# Patient Record
Sex: MALE | Race: WHITE | Employment: FULL TIME | URBAN - METROPOLITAN AREA
[De-identification: names, ages, dates, MRNs, and addresses within clinical notes are randomized per-mention and may not be internally consistent; named-entity substitution may affect disease eponyms.]

---

## 2021-03-19 ENCOUNTER — THERAPY VISIT (OUTPATIENT)
Dept: PHYSICAL THERAPY | Facility: CLINIC | Age: 31
End: 2021-03-19
Payer: COMMERCIAL

## 2021-03-19 DIAGNOSIS — M25.312 INSTABILITY OF LEFT SHOULDER JOINT: ICD-10-CM

## 2021-03-19 PROCEDURE — 97161 PT EVAL LOW COMPLEX 20 MIN: CPT | Mod: GP | Performed by: PHYSICAL THERAPIST

## 2021-03-19 PROCEDURE — 97110 THERAPEUTIC EXERCISES: CPT | Mod: GP | Performed by: PHYSICAL THERAPIST

## 2021-03-19 PROCEDURE — 97112 NEUROMUSCULAR REEDUCATION: CPT | Mod: GP | Performed by: PHYSICAL THERAPIST

## 2021-03-19 NOTE — PROGRESS NOTES
Physical Therapy Initial Evaluation  Subjective:  HPI                    Objective:  System    Physical Exam    General     ROS     SHOULDER EVALUATION    Physical Therapy Initial Examination/Evaluation March 19, 2021   Dieudonne Valadez is a 30 year old male referred to physical therapy by Dr. Betancourt for treatment of L shoulder instability  with Precautions/Restrictions/MD instructions E&T    Therapist Assessment:   Clinical Impression: Pt presents to PT with primary complaint of L shoulder pain and instability.  Per clinical examination, pt with slight limitation in ROM and strength following dislocation.   Pt will benefit from skilled physical therapy to regain ROM and initiate strength and stabilization program.      Subjective: Pt reports that his L shoulder dislocated on 2/27/2021 when the neighbors dog got into his yard and attacked his dog. He was able to put his shoulder back into place on his own but has continued to have pain and feelings of instability.   DOI/onset: 2/27/2021   Mechanism of injury: tackling neighbors's dog    DOS NA   Related PMH: R shoulder surgery on AC joint, biceps tear on the R, meniscus on the L, L arm fracture x2 in wrestling    Previous treatment: PT    Imaging: MRI    Chief Complaint: L shoulder instability    Pain: rest 0 /10, activity 4/10  Described as: stiff  Alleviated by: nothing Exacerbated by: rolling onto L side  Progression of symptoms since initial onset: improving  Time of day when pain is worse: none noted    Sleeping: does wake if he rolls onto his L side    Social history: history of wrestling in college, gabrielle trejo, has an 18 month old daughter   Occupation: analytics  Job duties: computer work    Current HEP/exercise regimen: gabrielle trejo, strength training, running   Patient's goals are be able to shoot bow, by Sept ideally    Other pertinent PMH: See EMR General health as reported by patient: Good    Return to MD: prn      STATIC POSTURE  Forward head: unremarkable         Shoulder internally rotated:unremarkable      SHOULDER RANGE OF MOTION  AROM Flexion Abduction ER (neutral) Flex/ER Ext/IR   Left  WNL WNL T12   Right WNL WNL WNL WNL T10       SHOULDER STRENGTH  MMT Flexion Scaption ER IR   Left 4/5 4/5 4/5 4+/5   Right 5/5 5/5 5/5 5/5     SPECIAL TESTS Left Right     Load and Shift - -  Impingement (5) - -  Labrum - -  AC joint - -  Apprehension + -      Assessment/Plan:  Patient is a 30 year old male with left side shoulder complaints.    Patient has the following significant findings with corresponding treatment plan.                Diagnosis 1:  L shoulder Instability   Pain -  self management, education and home program  Decreased ROM/flexibility - manual therapy, therapeutic exercise, therapeutic activity and home program  Decreased strength - therapeutic exercise, therapeutic activities and home program  Impaired muscle performance - neuro re-education and home program  Decreased function - therapeutic activities and home program    Therapy Evaluation Codes:   1) History comprised of:   Personal factors that impact the plan of care:      Past/current experiences and Time since onset of symptoms.    Comorbidity factors that impact the plan of care are:      See EMR.     Medications impacting care: See EMR.  2) Examination of Body Systems comprised of:   Body structures and functions that impact the plan of care:      Shoulder.   Activity limitations that impact the plan of care are:      Bathing, Dressing, Lifting, Sports and Sleeping.  3) Clinical presentation characteristics are:   Stable/Uncomplicated.  4) Decision-Making    Low complexity using standardized patient assessment instrument and/or measureable assessment of functional outcome.  Cumulative Therapy Evaluation is: Low complexity.    Previous and current functional limitations:  (See Goal Flow Sheet for this information)    Short term and Long term goals: (See Goal Flow Sheet for this information)      Communication ability:  Patient appears to be able to clearly communicate and understand verbal and written communication and follow directions correctly.  Treatment Explanation - The following has been discussed with the patient:   RX ordered/plan of care  Anticipated outcomes  Possible risks and side effects  This patient would benefit from PT intervention to resume normal activities.   Rehab potential is good.    Frequency:  1-2 X week, once daily  Duration:  for 6 weeks  Discharge Plan:  Achieve all LTG.  Independent in home treatment program.  Reach maximal therapeutic benefit.    Please refer to the daily flowsheet for treatment today, total treatment time and time spent performing 1:1 timed codes.

## 2021-03-19 NOTE — LETTER
[facility]  [address]  [address]  [place] [address]  [phone]    2021    Re: Dieudonne Valadez   :   1990  MRN:  [id number]   REFERRING PHYSICIAN:   Dionisio Betancourt MD    [facility]  Date of Initial Evaluation:  3/19/2021  Visits:  Rxs Used: 1  Reason for Referral:  Instability of left shoulder joint    EVALUATION SUMMARY    Physical Therapy Initial Evaluation  SHOULDER EVALUATION  Physical Therapy Initial Examination/Evaluation 2021   Dieudonne Valadez is a 30 year old male referred to physical therapy by Dr. Betancourt for treatment of L shoulder instability  with Precautions/Restrictions/MD instructions E&T    Therapist Assessment:   Clinical Impression: Pt presents to PT with primary complaint of L shoulder pain and instability.  Per clinical examination, pt with slight limitation in ROM and strength following dislocation.   Pt will benefit from skilled physical therapy to regain ROM and initiate strength and stabilization program.      Subjective: Pt reports that his L shoulder dislocated on 2021 when the neighbors dog got into his yard and attacked his dog. He was able to put his shoulder back into place on his own but has continued to have pain and feelings of instability.   DOI/onset: 2021   Mechanism of injury: tackling neighbors's dog    DOS NA   Related PMH: R shoulder surgery on AC joint, biceps tear on the R, meniscus on the L, L arm fracture x2 in wrestling    Previous treatment: PT    Imaging: MRI    Chief Complaint: L shoulder instability    Pain: rest 0 /10, activity 4/10  Described as: stiff  Alleviated by: nothing Exacerbated by: rolling onto L side  Progression of symptoms since initial onset: improving  Time of day when pain is worse: none noted    Sleeping: does wake if he rolls onto his L side    Social history: history of wrestling in college, gabrielle trejo, has an  18 month old daughter   Occupation: analytics  Job duties: computer work    Current HEP/exercise regimen: ju jitsu, strength training, running   Patient's goals are be able to shoot bow, by Sept ideally    Other pertinent PMH: See EMR General health as reported by patient: Good    Return to MD: karly        Re: Dieudonne Valadez   :   1990    STATIC POSTURE  Forward head: unremarkable        Shoulder internally rotated:unremarkable      SHOULDER RANGE OF MOTION  AROM Flexion Abduction ER (neutral) Flex/ER Ext/IR   Left  WNL WNL T12   Right WNL WNL WNL WNL T10     SHOULDER STRENGTH  MMT Flexion Scaption ER IR   Left 4/5 4/5 4/5 4+/5   Right 5/5 5/5 5/5 5/5     SPECIAL TESTS Left Right   Load and Shift - -  Impingement (5) - -  Labrum - -  AC joint - -  Apprehension + -    Assessment/Plan:  Patient is a 30 year old male with left side shoulder complaints.    Patient has the following significant findings with corresponding treatment plan.                Diagnosis 1:  L shoulder Instability   Pain -  self management, education and home program  Decreased ROM/flexibility - manual therapy, therapeutic exercise, therapeutic activity and home program  Decreased strength - therapeutic exercise, therapeutic activities and home program  Impaired muscle performance - neuro re-education and home program  Decreased function - therapeutic activities and home program    Therapy Evaluation Codes:   1) History comprised of:   Personal factors that impact the plan of care:      Past/current experiences and Time since onset of symptoms.    Comorbidity factors that impact the plan of care are:      See EMR.     Medications impacting care: See EMR.  2) Examination of Body Systems comprised of:   Body structures and functions that impact the plan of care:      Shoulder.   Activity limitations that impact the plan of care are:      Bathing, Dressing, Lifting, Sports and Sleeping.  3) Clinical presentation characteristics  are:   Stable/Uncomplicated.  4) Decision-Making    Low complexity using standardized patient assessment instrument and/or measureable assessment of functional outcome.  Cumulative Therapy Evaluation is: Low complexity.  Re: Dieudonne Valadez   :   1990    Previous and current functional limitations:  (See Goal Flow Sheet for this information)    Short term and Long term goals: (See Goal Flow Sheet for this information)     Communication ability:  Patient appears to be able to clearly communicate and understand verbal and written communication and follow directions correctly.  Treatment Explanation - The following has been discussed with the patient:   RX ordered/plan of care  Anticipated outcomes  Possible risks and side effects  This patient would benefit from PT intervention to resume normal activities.   Rehab potential is good.    Frequency:  1-2 X week, once daily  Duration:  for 6 weeks  Discharge Plan:  Achieve all LTG.  Independent in home treatment program.  Reach maximal therapeutic benefit.    Please refer to the daily flowsheet for treatment today, total treatment time and time spent performing 1:1 timed codes.         Thank you for your referral.    INQUIRIES  Therapist: Tamiko Castle, PT, DPT   [facility] SERVICES [address] [address]  Phone: [phone]  Fax: [phone]

## 2021-04-06 ENCOUNTER — THERAPY VISIT (OUTPATIENT)
Dept: PHYSICAL THERAPY | Facility: CLINIC | Age: 31
End: 2021-04-06
Payer: COMMERCIAL

## 2021-04-06 DIAGNOSIS — M25.312 INSTABILITY OF LEFT SHOULDER JOINT: ICD-10-CM

## 2021-04-06 PROCEDURE — 97110 THERAPEUTIC EXERCISES: CPT | Mod: GP | Performed by: PHYSICAL THERAPIST

## 2021-04-06 PROCEDURE — 97112 NEUROMUSCULAR REEDUCATION: CPT | Mod: GP | Performed by: PHYSICAL THERAPIST

## 2021-04-06 PROCEDURE — 97140 MANUAL THERAPY 1/> REGIONS: CPT | Mod: GP | Performed by: PHYSICAL THERAPIST

## 2021-04-13 ENCOUNTER — THERAPY VISIT (OUTPATIENT)
Dept: PHYSICAL THERAPY | Facility: CLINIC | Age: 31
End: 2021-04-13
Payer: COMMERCIAL

## 2021-04-13 DIAGNOSIS — M25.312 INSTABILITY OF LEFT SHOULDER JOINT: ICD-10-CM

## 2021-04-13 PROCEDURE — 97110 THERAPEUTIC EXERCISES: CPT | Mod: GP | Performed by: PHYSICAL THERAPIST

## 2021-04-13 PROCEDURE — 97140 MANUAL THERAPY 1/> REGIONS: CPT | Mod: GP | Performed by: PHYSICAL THERAPIST

## 2021-05-11 ENCOUNTER — THERAPY VISIT (OUTPATIENT)
Dept: PHYSICAL THERAPY | Facility: CLINIC | Age: 31
End: 2021-05-11
Payer: COMMERCIAL

## 2021-05-11 DIAGNOSIS — M25.312 INSTABILITY OF LEFT SHOULDER JOINT: ICD-10-CM

## 2021-05-11 PROCEDURE — 97112 NEUROMUSCULAR REEDUCATION: CPT | Mod: GP | Performed by: PHYSICAL THERAPIST

## 2021-05-11 PROCEDURE — 97110 THERAPEUTIC EXERCISES: CPT | Mod: GP | Performed by: PHYSICAL THERAPIST

## 2022-03-14 PROBLEM — M25.312 INSTABILITY OF LEFT SHOULDER JOINT: Status: RESOLVED | Noted: 2021-03-19 | Resolved: 2022-03-14
